# Patient Record
Sex: FEMALE | URBAN - METROPOLITAN AREA
[De-identification: names, ages, dates, MRNs, and addresses within clinical notes are randomized per-mention and may not be internally consistent; named-entity substitution may affect disease eponyms.]

---

## 2021-05-03 ENCOUNTER — OFFICE VISIT (OUTPATIENT)
Dept: URBAN - METROPOLITAN AREA CLINIC 22 | Facility: CLINIC | Age: 31
End: 2021-05-03
Payer: OTHER GOVERNMENT

## 2021-05-03 DIAGNOSIS — H52.223 REGULAR ASTIGMATISM, BILATERAL: ICD-10-CM

## 2021-05-03 DIAGNOSIS — R51.9 HEADACHE, UNSPECIFIED: Primary | ICD-10-CM

## 2021-05-03 PROCEDURE — 92004 COMPRE OPH EXAM NEW PT 1/>: CPT | Performed by: STUDENT IN AN ORGANIZED HEALTH CARE EDUCATION/TRAINING PROGRAM

## 2021-05-03 ASSESSMENT — VISUAL ACUITY
OS: 20/20
OD: 20/20

## 2021-05-03 ASSESSMENT — INTRAOCULAR PRESSURE
OD: 14
OS: 15

## 2021-05-03 NOTE — IMPRESSION/PLAN
Impression: Regular astigmatism, bilateral: H52.223. Plan: Pt has never worn glasses before. High cyl OS>OD today, may be causing eye strain and headaches. Recommend returning for refraction.

## 2021-05-03 NOTE — IMPRESSION/PLAN
Impression: Headache, unspecified: R51.9. Plan: Pt reports headache over the last few months, left side of head. Relief when she goes to sleep. 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 Discussed that uncorrected vision could be causing strain/headache. Recommend glasses Rx (pt declined today due to cost). If no improvement in headaches w/ glasses, recommend following up with PCP.